# Patient Record
Sex: FEMALE | Race: WHITE | HISPANIC OR LATINO
[De-identification: names, ages, dates, MRNs, and addresses within clinical notes are randomized per-mention and may not be internally consistent; named-entity substitution may affect disease eponyms.]

---

## 2023-12-07 ENCOUNTER — APPOINTMENT (OUTPATIENT)
Dept: ANTEPARTUM | Facility: CLINIC | Age: 23
End: 2023-12-07

## 2024-01-08 PROBLEM — Z00.00 ENCOUNTER FOR PREVENTIVE HEALTH EXAMINATION: Status: ACTIVE | Noted: 2024-01-08

## 2024-01-22 PROBLEM — Z87.59 HISTORY OF SPONTANEOUS ABORTION: Status: RESOLVED | Noted: 2024-01-22 | Resolved: 2024-01-22

## 2024-01-22 PROBLEM — R78.71 ELEVATED BLOOD LEAD LEVEL: Status: ACTIVE | Noted: 2024-01-22

## 2024-01-22 PROBLEM — Z34.90 PREGNANCY: Status: ACTIVE | Noted: 2024-01-22

## 2024-01-22 RX ORDER — PRENATAL VIT 49/IRON FUM/FOLIC 6.75-0.2MG
TABLET ORAL
Refills: 0 | Status: ACTIVE | COMMUNITY

## 2024-01-26 ENCOUNTER — APPOINTMENT (OUTPATIENT)
Dept: MATERNAL FETAL MEDICINE | Facility: CLINIC | Age: 24
End: 2024-01-26
Payer: MEDICAID

## 2024-01-26 ENCOUNTER — APPOINTMENT (OUTPATIENT)
Dept: ANTEPARTUM | Facility: CLINIC | Age: 24
End: 2024-01-26
Payer: MEDICAID

## 2024-01-26 ENCOUNTER — ASOB RESULT (OUTPATIENT)
Age: 24
End: 2024-01-26

## 2024-01-26 VITALS
HEIGHT: 57 IN | WEIGHT: 101 LBS | DIASTOLIC BLOOD PRESSURE: 68 MMHG | HEART RATE: 65 BPM | RESPIRATION RATE: 16 BRPM | SYSTOLIC BLOOD PRESSURE: 103 MMHG | BODY MASS INDEX: 21.79 KG/M2 | OXYGEN SATURATION: 99 %

## 2024-01-26 DIAGNOSIS — Z87.59 PERSONAL HISTORY OF OTHER COMPLICATIONS OF PREGNANCY, CHILDBIRTH AND THE PUERPERIUM: ICD-10-CM

## 2024-01-26 DIAGNOSIS — Z34.90 ENCOUNTER FOR SUPERVISION OF NORMAL PREGNANCY, UNSPECIFIED, UNSPECIFIED TRIMESTER: ICD-10-CM

## 2024-01-26 DIAGNOSIS — R78.71 ABNORMAL LEAD LVL IN BLOOD: ICD-10-CM

## 2024-01-26 PROCEDURE — 76815 OB US LIMITED FETUS(S): CPT

## 2024-01-26 PROCEDURE — 99202 OFFICE O/P NEW SF 15 MIN: CPT | Mod: TH

## 2024-01-26 NOTE — ACTIVE PROBLEMS
[Diabetes Mellitus] : no diabetes mellitus [Hypertension] : no hypertension [Heart Disease] : no heart disease [Autoimmune Disease] : no autoimmune disease [Renal Disease] : no kidney disease, no UTI [Neurologic Disorder] : no neurologic disorder, no epilepsy [Psychiatric Disorders] : no psychiatric disorders [Depression] : no depression, no post partum depression [Hepatic Disorder] : no hepatitis, no liver disease [Thrombophlebitis] : no varicosities, no phlebitis [Thyroid Disorder] : no thyroid dysfunction [Trauma] : no trauma/violence [Blood Transfusion (___ Ml)] : no history of blood transfusion [FreeTextEntry1] : Pt gave verbal consent for phone intake on 24.  [facility] for elevated lead levels in pregnancy. Pt is a 23yr old . Hx  and SAB. With current preg, pt noted to have elevated lead level= 7.7ug/dL on 23. Repeat lead level of 5.5ug/dL on 23. Pt states her OB has been testing her lead levels with every visit. Next OB scheduled for 24. Pt went to [facility] on  for lower abdominal pain. She states she was only there for a few hours in the ER. She had a sonogram done and was told the baby is okay. She was told her has a UTI and is currently taking an antibiotic once daily, but can't remember the name of the med. She is also taking PNV daily. She states she no longer has abdominal pains and denies any LOF or vaginal bleeding. Sonogram today.

## 2024-01-26 NOTE — FAMILY HISTORY
[Age 35+ During Pregnancy] : not 35 or over during pregnancy [Reported Family History Of Birth Defects] : no congenital heart defects [Kolton-Sachs Carrier] : no Kolton-Sachs [Family History] : no mental retardation/autism [Reported Family History Of Genetic Disease] : no history of child defect in child of baby father

## 2024-01-26 NOTE — DISCUSSION/SUMMARY
[FreeTextEntry1] : Dear CHARLEY Gaona,  We had the pleasure of seeing your patient, GABRIEL SRINIVASAN, for Maternal Fetal Medicine consultation on 2024. As you know, she is a  at 18 weeks 1 day referred for a history of elevated lead levels. She is feeling overall well today and without complaints.  She had an initial lead level of 7.7 mcg/dL and repeat 1 month later was 5.5 mcg/dL.  I told her that maternal elevated lead levels have been associated with adverse pregnancy outcomes such as spontaneous abortions, gestational hypertension, low birth weight infants, and impaired infant neurodevelopment. Lead crosses the placenta and can be detected in the fetal brain. She is not currently working and no construction is being done to her home. She does not work with lead. She only uses packaged spices and denies any supplement use. Her partner is a , which could be a potential source of exposure. I recommended he wash hands and change out of his work clothes upon returning home. There is no indication for treatment at this time, chelation therapy is indicated for levels > 40. Recommend a balanced diet that contains 2,000 mg of calcium and  mg of iron daily which can aid delay in lead absorption.  CDC recommends for patients to have a serum blood lead level, or an umbilical cord lead level done at the time of her delivery.  Thank you for requesting a consultation on this patient. The total time spent in preparation for this visit, medical history taking, orders, review of records, counseling the patient, and writing this note was 30 minutes.   At the end of our discussion, the patient indicated that her questions were answered and she seemed satisfied with our discussion. Please do not hesitate to contact us with any questions.   Sincerely,     Yamileth Rosa MD FACOG Maternal-Fetal Medicine

## 2024-01-26 NOTE — OB HISTORY
[Definite:  ___ (Date)] : the last menstrual period was [unfilled] [Spontaneous] : Spontaneous conception [Pregnancy History] : patient did not receive anesthesia [___] : no pregnancy complications reported

## 2024-02-05 ENCOUNTER — ASOB RESULT (OUTPATIENT)
Age: 24
End: 2024-02-05

## 2024-02-05 ENCOUNTER — APPOINTMENT (OUTPATIENT)
Dept: ANTEPARTUM | Facility: CLINIC | Age: 24
End: 2024-02-05
Payer: MEDICAID

## 2024-02-05 PROCEDURE — 76817 TRANSVAGINAL US OBSTETRIC: CPT

## 2024-02-05 PROCEDURE — 76811 OB US DETAILED SNGL FETUS: CPT

## 2024-02-22 ENCOUNTER — ASOB RESULT (OUTPATIENT)
Age: 24
End: 2024-02-22

## 2024-02-22 ENCOUNTER — APPOINTMENT (OUTPATIENT)
Dept: ANTEPARTUM | Facility: CLINIC | Age: 24
End: 2024-02-22
Payer: MEDICAID

## 2024-02-22 PROCEDURE — 76816 OB US FOLLOW-UP PER FETUS: CPT

## 2024-03-21 ENCOUNTER — APPOINTMENT (OUTPATIENT)
Dept: ANTEPARTUM | Facility: CLINIC | Age: 24
End: 2024-03-21

## 2024-04-25 ENCOUNTER — APPOINTMENT (OUTPATIENT)
Dept: ANTEPARTUM | Facility: CLINIC | Age: 24
End: 2024-04-25
Payer: MEDICAID

## 2024-04-25 ENCOUNTER — ASOB RESULT (OUTPATIENT)
Age: 24
End: 2024-04-25

## 2024-04-25 PROCEDURE — 76816 OB US FOLLOW-UP PER FETUS: CPT

## 2024-04-25 PROCEDURE — 76819 FETAL BIOPHYS PROFIL W/O NST: CPT | Mod: 59

## 2024-05-01 ENCOUNTER — APPOINTMENT (OUTPATIENT)
Dept: ANTEPARTUM | Facility: CLINIC | Age: 24
End: 2024-05-01

## 2024-05-02 ENCOUNTER — ASOB RESULT (OUTPATIENT)
Age: 24
End: 2024-05-02

## 2024-05-02 ENCOUNTER — APPOINTMENT (OUTPATIENT)
Dept: ANTEPARTUM | Facility: CLINIC | Age: 24
End: 2024-05-02
Payer: MEDICAID

## 2024-05-02 PROCEDURE — 76818 FETAL BIOPHYS PROFILE W/NST: CPT

## 2024-05-09 ENCOUNTER — ASOB RESULT (OUTPATIENT)
Age: 24
End: 2024-05-09

## 2024-05-09 ENCOUNTER — APPOINTMENT (OUTPATIENT)
Dept: ANTEPARTUM | Facility: CLINIC | Age: 24
End: 2024-05-09
Payer: MEDICAID

## 2024-05-09 PROCEDURE — 76818 FETAL BIOPHYS PROFILE W/NST: CPT

## 2024-05-16 ENCOUNTER — APPOINTMENT (OUTPATIENT)
Dept: ANTEPARTUM | Facility: CLINIC | Age: 24
End: 2024-05-16

## 2024-05-23 ENCOUNTER — APPOINTMENT (OUTPATIENT)
Dept: ANTEPARTUM | Facility: CLINIC | Age: 24
End: 2024-05-23
Payer: MEDICAID

## 2024-05-23 ENCOUNTER — ASOB RESULT (OUTPATIENT)
Age: 24
End: 2024-05-23

## 2024-05-23 PROCEDURE — 93976 VASCULAR STUDY: CPT

## 2024-05-23 PROCEDURE — 76819 FETAL BIOPHYS PROFIL W/O NST: CPT

## 2024-05-23 PROCEDURE — 76816 OB US FOLLOW-UP PER FETUS: CPT

## 2024-05-23 PROCEDURE — 76820 UMBILICAL ARTERY ECHO: CPT | Mod: 59

## 2024-05-28 ENCOUNTER — APPOINTMENT (OUTPATIENT)
Dept: ANTEPARTUM | Facility: CLINIC | Age: 24
End: 2024-05-28

## 2024-05-30 ENCOUNTER — APPOINTMENT (OUTPATIENT)
Dept: ANTEPARTUM | Facility: CLINIC | Age: 24
End: 2024-05-30

## 2024-06-03 ENCOUNTER — APPOINTMENT (OUTPATIENT)
Dept: ANTEPARTUM | Facility: CLINIC | Age: 24
End: 2024-06-03

## 2024-06-06 ENCOUNTER — APPOINTMENT (OUTPATIENT)
Dept: ANTEPARTUM | Facility: CLINIC | Age: 24
End: 2024-06-06

## 2024-06-10 ENCOUNTER — APPOINTMENT (OUTPATIENT)
Dept: ANTEPARTUM | Facility: CLINIC | Age: 24
End: 2024-06-10

## 2024-06-13 ENCOUNTER — APPOINTMENT (OUTPATIENT)
Dept: ANTEPARTUM | Facility: CLINIC | Age: 24
End: 2024-06-13

## 2024-06-17 ENCOUNTER — APPOINTMENT (OUTPATIENT)
Dept: ANTEPARTUM | Facility: CLINIC | Age: 24
End: 2024-06-17

## 2024-06-20 ENCOUNTER — APPOINTMENT (OUTPATIENT)
Dept: ANTEPARTUM | Facility: CLINIC | Age: 24
End: 2024-06-20